# Patient Record
Sex: FEMALE | Race: WHITE | NOT HISPANIC OR LATINO | URBAN - NONMETROPOLITAN AREA
[De-identification: names, ages, dates, MRNs, and addresses within clinical notes are randomized per-mention and may not be internally consistent; named-entity substitution may affect disease eponyms.]

---

## 2018-04-17 PROBLEM — E11.9: Noted: 2018-04-17

## 2019-05-21 ENCOUNTER — IMPORTED ENCOUNTER (OUTPATIENT)
Dept: URBAN - NONMETROPOLITAN AREA CLINIC 1 | Facility: CLINIC | Age: 59
End: 2019-05-21

## 2019-05-21 PROCEDURE — 92014 COMPRE OPH EXAM EST PT 1/>: CPT

## 2020-05-26 ENCOUNTER — IMPORTED ENCOUNTER (OUTPATIENT)
Dept: URBAN - NONMETROPOLITAN AREA CLINIC 1 | Facility: CLINIC | Age: 60
End: 2020-05-26

## 2020-05-26 PROCEDURE — 92014 COMPRE OPH EXAM EST PT 1/>: CPT

## 2020-05-26 NOTE — PATIENT DISCUSSION
DM s DR-Stressed the importance of keeping blood sugars under control blood pressure under control and weight normalization and regular visits with PCP. -Explained the possible effects of poorly controlled diabetes and the damage that diabetes can cause to ocular health. -Patient to check HgbA1C.-Pt instructed to contact our office with any vision changes. Cataract OU mild progression-Not yet surgical. -Reviewed symptoms of advancing cataract growth such as glare and halos and decreased vision.-Continue to monitor for now. Pt will notify us if any new symptoms develop.

## 2021-05-27 ENCOUNTER — IMPORTED ENCOUNTER (OUTPATIENT)
Dept: URBAN - NONMETROPOLITAN AREA CLINIC 1 | Facility: CLINIC | Age: 61
End: 2021-05-27

## 2021-05-27 PROCEDURE — 92014 COMPRE OPH EXAM EST PT 1/>: CPT

## 2021-05-27 NOTE — PATIENT DISCUSSION
DM s DR-Stressed the importance of keeping blood sugars under control blood pressure under control and weight normalization and regular visits with PCP. -Explained the possible effects of poorly controlled diabetes and the damage that diabetes can cause to ocular health. -Patient to check HgbA1C.-Pt instructed to contact our office with any vision changes. Cataract OU -Not yet surgical. -Reviewed symptoms of advancing cataract growth such as glare and halos and decreased vision.-Continue to monitor for now.  Pt will notify us if any new symptoms develop.+progression

## 2022-04-09 ASSESSMENT — VISUAL ACUITY
OS_SC: 20/25
OU_CC: J1
OD_CC: J1
OD_SC: 20/40
OS_SC: 20/30-2
OS_CC: J1
OD_SC: 20/40
OU_SC: 20/40
OU_CC: 20/25
OS_SC: 20/25
OD_CC: 20/25
OD_SC: 20/25-2
OS_CC: 20/25

## 2022-04-09 ASSESSMENT — TONOMETRY
OS_IOP_MMHG: 16
OD_IOP_MMHG: 17
OS_IOP_MMHG: 17
OD_IOP_MMHG: 16
OD_IOP_MMHG: 17
OS_IOP_MMHG: 17

## 2023-06-27 ENCOUNTER — ESTABLISHED PATIENT (OUTPATIENT)
Dept: RURAL CLINIC 1 | Facility: CLINIC | Age: 63
End: 2023-06-27

## 2023-06-27 DIAGNOSIS — E11.9: ICD-10-CM

## 2023-06-27 DIAGNOSIS — H25.13: ICD-10-CM

## 2023-06-27 PROCEDURE — 92014 COMPRE OPH EXAM EST PT 1/>: CPT

## 2023-06-27 ASSESSMENT — VISUAL ACUITY
OU_CC: 20/25-1
OS_CC: 20/40+1
OD_CC: 20/30
OD_CC: 20/40
OU_CC: 20/30
OS_CC: 20/30

## 2023-06-27 ASSESSMENT — TONOMETRY
OD_IOP_MMHG: 18
OS_IOP_MMHG: 18

## 2023-08-28 ENCOUNTER — CONSULTATION/EVALUATION (OUTPATIENT)
Dept: RURAL CLINIC 1 | Facility: CLINIC | Age: 63
End: 2023-08-28

## 2023-08-28 DIAGNOSIS — H25.813: ICD-10-CM

## 2023-08-28 PROCEDURE — 99214 OFFICE O/P EST MOD 30 MIN: CPT

## 2023-08-28 ASSESSMENT — VISUAL ACUITY
OD_SC: 20/50-1
OD_PAM: 20/30
OS_SC: 20/80
OD_CC: 20/20
OD_PH: 20/40
OD_BAT: 20/150
OU_CC: 20/40-1
OU_CC: 20/20
OU_SC: 20/70
OS_AM: 20/40
OD_CC: 20/40-1
OS_PH: 20/40
OS_CC: 20/50-1
OS_CC: 20/20

## 2023-08-28 ASSESSMENT — TONOMETRY
OS_IOP_MMHG: 18
OD_IOP_MMHG: 16

## 2023-10-12 ENCOUNTER — PRE-OP/H&P (OUTPATIENT)
Dept: RURAL CLINIC 1 | Facility: CLINIC | Age: 63
End: 2023-10-12

## 2023-10-12 VITALS
HEIGHT: 66 IN | SYSTOLIC BLOOD PRESSURE: 120 MMHG | DIASTOLIC BLOOD PRESSURE: 72 MMHG | HEART RATE: 56 BPM | BODY MASS INDEX: 28.77 KG/M2 | WEIGHT: 179 LBS

## 2023-10-12 DIAGNOSIS — Z01.818: ICD-10-CM

## 2023-10-12 DIAGNOSIS — H25.813: ICD-10-CM

## 2023-10-12 PROCEDURE — 92025 CPTRIZED CORNEAL TOPOGRAPHY: CPT | Mod: NC

## 2023-10-12 PROCEDURE — 99499 UNLISTED E&M SERVICE: CPT

## 2023-10-12 PROCEDURE — 92136 OPHTHALMIC BIOMETRY: CPT

## 2023-10-24 ENCOUNTER — SURGERY/PROCEDURE (OUTPATIENT)
Dept: URBAN - METROPOLITAN AREA SURGERY 3 | Facility: SURGERY | Age: 63
End: 2023-10-24

## 2023-10-24 DIAGNOSIS — H25.811: ICD-10-CM

## 2023-10-24 PROCEDURE — 68841 INSJ RX ELUT IMPLT LAC CANAL: CPT | Mod: 51,RT

## 2023-10-24 PROCEDURE — 66984 XCAPSL CTRC RMVL W/O ECP: CPT

## 2023-10-25 ENCOUNTER — POST-OP (OUTPATIENT)
Dept: RURAL CLINIC 1 | Facility: CLINIC | Age: 63
End: 2023-10-25

## 2023-10-25 DIAGNOSIS — Z96.1: ICD-10-CM

## 2023-10-25 PROCEDURE — 99024 POSTOP FOLLOW-UP VISIT: CPT

## 2023-10-25 ASSESSMENT — TONOMETRY: OD_IOP_MMHG: 16

## 2023-10-25 ASSESSMENT — VISUAL ACUITY: OD_SC: 20/40

## 2023-10-30 ENCOUNTER — POST OP/EVAL OF SECOND EYE (OUTPATIENT)
Dept: RURAL CLINIC 1 | Facility: CLINIC | Age: 63
End: 2023-10-30

## 2023-10-30 DIAGNOSIS — Z96.1: ICD-10-CM

## 2023-10-30 PROCEDURE — 99024 POSTOP FOLLOW-UP VISIT: CPT

## 2023-10-30 ASSESSMENT — VISUAL ACUITY
OS_SC: 20/80
OD_SC: 20/25
OS_CC: 20/50-1
OS_PH: 20/40
OS_CC: 20/20
OS_AM: 20/40
OD_SC: 20/100

## 2023-10-30 ASSESSMENT — TONOMETRY
OS_IOP_MMHG: 17
OD_IOP_MMHG: 16

## 2023-11-07 ENCOUNTER — SURGERY/PROCEDURE (OUTPATIENT)
Dept: URBAN - METROPOLITAN AREA SURGERY 3 | Facility: SURGERY | Age: 63
End: 2023-11-07

## 2023-11-07 DIAGNOSIS — H25.812: ICD-10-CM

## 2023-11-07 PROCEDURE — 66984 XCAPSL CTRC RMVL W/O ECP: CPT

## 2023-11-07 PROCEDURE — 68841 INSJ RX ELUT IMPLT LAC CANAL: CPT

## 2023-11-08 ENCOUNTER — POST-OP (OUTPATIENT)
Dept: RURAL CLINIC 1 | Facility: CLINIC | Age: 63
End: 2023-11-08

## 2023-11-08 DIAGNOSIS — Z96.1: ICD-10-CM

## 2023-11-08 PROCEDURE — 99024 POSTOP FOLLOW-UP VISIT: CPT

## 2023-11-08 ASSESSMENT — VISUAL ACUITY: OS_SC: 20/30+2

## 2023-11-08 ASSESSMENT — TONOMETRY: OS_IOP_MMHG: 16

## 2023-11-16 ENCOUNTER — POST-OP (OUTPATIENT)
Dept: RURAL CLINIC 1 | Facility: CLINIC | Age: 63
End: 2023-11-16

## 2023-11-16 DIAGNOSIS — Z96.1: ICD-10-CM

## 2023-11-16 PROCEDURE — 99024 POSTOP FOLLOW-UP VISIT: CPT

## 2023-11-16 ASSESSMENT — TONOMETRY
OD_IOP_MMHG: 17
OS_IOP_MMHG: 16

## 2023-11-16 ASSESSMENT — VISUAL ACUITY
OD_SC: 20/25-1
OS_SC: 20/20
OU_SC: 20/20

## 2023-12-07 ENCOUNTER — POST-OP (OUTPATIENT)
Dept: RURAL CLINIC 1 | Facility: CLINIC | Age: 63
End: 2023-12-07

## 2023-12-07 DIAGNOSIS — Z96.1: ICD-10-CM

## 2023-12-07 PROCEDURE — 99024 POSTOP FOLLOW-UP VISIT: CPT

## 2023-12-07 ASSESSMENT — VISUAL ACUITY
OS_SC: 20/20
OD_SC: 20/20

## 2024-04-11 ENCOUNTER — ESTABLISHED PATIENT (OUTPATIENT)
Dept: RURAL CLINIC 1 | Facility: CLINIC | Age: 64
End: 2024-04-11

## 2024-04-11 DIAGNOSIS — H25.89: ICD-10-CM

## 2024-04-11 DIAGNOSIS — Z96.1: ICD-10-CM

## 2024-04-11 DIAGNOSIS — H40.013: ICD-10-CM

## 2024-04-11 DIAGNOSIS — E11.9: ICD-10-CM

## 2024-04-11 PROCEDURE — 92014 COMPRE OPH EXAM EST PT 1/>: CPT

## 2024-04-11 ASSESSMENT — VISUAL ACUITY
OU_CC: 20/20
OS_SC: 20/25
OU_SC: 20/20
OD_SC: 20/25

## 2024-04-11 ASSESSMENT — TONOMETRY
OS_IOP_MMHG: 16
OD_IOP_MMHG: 17

## 2024-10-17 ENCOUNTER — FOLLOW UP (OUTPATIENT)
Dept: RURAL CLINIC 1 | Facility: CLINIC | Age: 64
End: 2024-10-17

## 2024-10-17 DIAGNOSIS — H25.89: ICD-10-CM

## 2024-10-17 DIAGNOSIS — H40.013: ICD-10-CM

## 2024-10-17 DIAGNOSIS — Z96.1: ICD-10-CM

## 2024-10-17 DIAGNOSIS — E11.9: ICD-10-CM

## 2024-10-17 PROCEDURE — 92083 EXTENDED VISUAL FIELD XM: CPT

## 2024-10-17 PROCEDURE — 99214 OFFICE O/P EST MOD 30 MIN: CPT

## 2025-04-17 ENCOUNTER — FOLLOW UP (OUTPATIENT)
Age: 65
End: 2025-04-17

## 2025-04-17 DIAGNOSIS — E11.9: ICD-10-CM

## 2025-04-17 DIAGNOSIS — H40.013: ICD-10-CM

## 2025-04-17 DIAGNOSIS — H25.89: ICD-10-CM

## 2025-04-17 PROCEDURE — 92014 COMPRE OPH EXAM EST PT 1/>: CPT

## 2025-04-17 PROCEDURE — 92133 CPTRZD OPH DX IMG PST SGM ON: CPT
